# Patient Record
Sex: MALE | Race: WHITE | NOT HISPANIC OR LATINO
[De-identification: names, ages, dates, MRNs, and addresses within clinical notes are randomized per-mention and may not be internally consistent; named-entity substitution may affect disease eponyms.]

---

## 2021-09-02 ENCOUNTER — APPOINTMENT (OUTPATIENT)
Dept: UROLOGY | Facility: CLINIC | Age: 71
End: 2021-09-02
Payer: MEDICARE

## 2021-09-02 VITALS
WEIGHT: 168 LBS | DIASTOLIC BLOOD PRESSURE: 71 MMHG | SYSTOLIC BLOOD PRESSURE: 126 MMHG | BODY MASS INDEX: 24.05 KG/M2 | HEIGHT: 70 IN | HEART RATE: 51 BPM

## 2021-09-02 DIAGNOSIS — Z82.49 FAMILY HISTORY OF ISCHEMIC HEART DISEASE AND OTHER DISEASES OF THE CIRCULATORY SYSTEM: ICD-10-CM

## 2021-09-02 DIAGNOSIS — Z86.79 PERSONAL HISTORY OF OTHER DISEASES OF THE CIRCULATORY SYSTEM: ICD-10-CM

## 2021-09-02 DIAGNOSIS — Z78.9 OTHER SPECIFIED HEALTH STATUS: ICD-10-CM

## 2021-09-02 DIAGNOSIS — Z80.9 FAMILY HISTORY OF MALIGNANT NEOPLASM, UNSPECIFIED: ICD-10-CM

## 2021-09-02 PROBLEM — Z00.00 ENCOUNTER FOR PREVENTIVE HEALTH EXAMINATION: Status: ACTIVE | Noted: 2021-09-02

## 2021-09-02 PROCEDURE — 99204 OFFICE O/P NEW MOD 45 MIN: CPT

## 2021-09-02 RX ORDER — METOPROLOL TARTRATE 75 MG/1
TABLET, FILM COATED ORAL
Refills: 0 | Status: ACTIVE | COMMUNITY

## 2021-09-02 NOTE — PHYSICAL EXAM
[General Appearance - Well Developed] : well developed [General Appearance - Well Nourished] : well nourished [Normal Appearance] : normal appearance [Well Groomed] : well groomed [General Appearance - In No Acute Distress] : no acute distress [Heart Rate And Rhythm] : Heart rate and rhythm were normal [Respiration, Rhythm And Depth] : normal respiratory rhythm and effort [Exaggerated Use Of Accessory Muscles For Inspiration] : no accessory muscle use [Auscultation Breath Sounds / Voice Sounds] : lungs were clear to auscultation bilaterally [Abdomen Soft] : soft [Abdomen Tenderness] : non-tender [Abdomen Hernia] : no hernia was discovered [Costovertebral Angle Tenderness] : no ~M costovertebral angle tenderness [Penis Abnormality] : normal circumcised penis [Epididymis] : the epididymides were normal [Testes Tenderness] : no tenderness of the testes [Testes Mass (___cm)] : there were no testicular masses [Anus Abnormality] : the anus and perineum were normal [Rectal Exam - Rectum] : digital rectal exam was normal [Prostate Enlargement] : the prostate was not enlarged [Prostate Tenderness] : the prostate was not tender [No Prostate Nodules] : no prostate nodules [Prostate Size ___ gm] : prostate size [unfilled] gm [Normal Station and Gait] : the gait and station were normal for the patient's age [] : no rash [Oriented To Time, Place, And Person] : oriented to person, place, and time [Affect] : the affect was normal [Mood] : the mood was normal [Not Anxious] : not anxious [FreeTextEntry1] : DTR's & BC reflexes were intact

## 2021-09-02 NOTE — HISTORY OF PRESENT ILLNESS
[Urinary Urgency] : urinary urgency [Urinary Frequency] : urinary frequency [Nocturia] : nocturia [Straining] : straining [Weak Stream] : weak stream [Intermittency] : intermittency [Erectile Dysfunction] : Erectile Dysfunction [None] : None [FreeTextEntry1] : Bijan is a 71-year-old white male born March 18, 1950 who presents with voiding and erectile dysfunction.  He has had the erectile dysfunction for at least 3 years.  He has had the voiding dysfunction for 7 or 8 years tells me he has been tried on Flomax, finasteride as well as several other drugs which really did not help\par \par With respect to the erections he has been taking sildenafil at the 100 mg dose which no longer works as well as he and his wife would like.  In fact his wife feels that some of his erectile dysfunction may be secondary to some of the medications that he had taken in the past\par \par His AUA symptom score includes\par Incomplete emptying–4\par Frequency–5\par Intermittency–4\par Urgency–4\par Slow stream–4\par Straining–4\par He wakes up at least 3 times per night which is making him quite tired and he is quality of life is equal to 3.  This gives him an AUA symptom score of 25/3/3\par \par With respect to his erections 100 mg of sildenafil is not something that he can use but he has not had ejaculation in close to 3 years.  With manual stimulation he can be brought to ejaculation but it is quite weak.\par \par His international index of erectile function equals\par Confidence–2\par Rigidity for penetration–2\par Maintenance–2\par Completion–2\par Satisfaction–3 that gives him and I IEF of 11\par \par He has seen a urologist for these issues over the years and has not had treatment that he felt was sufficient.  He had blood tests drawn 6 or 7 weeks ago we do not yet have the reports \par \par For the last year or so he has been having cardiac issues and tells me his doctor said he has cardiomegaly [Urinary Retention] : no urinary retention [Dysuria] : no dysuria [Hematuria - Gross] : no gross hematuria [Hematuria - Microscopic] : no microscopic hematuria

## 2021-09-02 NOTE — ASSESSMENT
[FreeTextEntry1] : He has voiding dysfunction with a small prostate and tells me he has been tried on various medications to start to remember which ones and who gave it to him.\par \par I will ask him to keep a record of his intake and output we will send urine off for analysis we will get a renal bladder ultrasound come back in for review and possibly a flow study\par \par He also has erectile dysfunction which is no longer responsive as well as would like to sildenafil.  I will order a full hormone panel especially given his testicular atrophy but given the penile atrophy this may be vascular in origin.  Going to want [place] criteria classification especially given his history of cardiomegaly when he comes back and we will review the results and discuss options.  Please note if he does stay with sildenafil I would recommend to 100 mg tablet instead of 520s at this much

## 2021-09-02 NOTE — ADDENDUM
[FreeTextEntry1] : After we were done and I tried to explain to him the various steps that we need to be taken he told me that he really does not want anything done with his urination.  He has had this for years seen many doctors and does not think anyone will ever be able to help him he just want the erections taken care of.\par \par He then told me that he had gone to [facility] where they did some sort of exam gave him an injection which did not work and offered him a better treatment for $2000.  He wants to know if I do the injection and if he can get that the next time he comes.\par \par He also said he wants to make sure that the next time is not an hour and a half before he gets in the room and sees the doctor and that he is a very busy man.  I apologized to sure that I was not here just sitting and doing nothing I am going from patient to patient since early this morning and that when he gets here he still has to register and he has to fill out papers and then he has to get into the room.  We will also waiting for some blood test which never came and his answer was he does not care he has been here an hour and a half and he is a busy man.\par \par He also told me he does not want to come out to [place] but I was referred as one of the most experience was in the area with this.  I offer him to contact [facility] who has a specialist in erectile dysfunction.  Once he makes an appointment and send us a release had be happy to send our note with full the record so he would not have to go through this again and help expedite his care over there.  He declined saying that since I am recommended he wants to come here.\par \par He will decide what he wants to do he want me to write him a prescription for sildenafil which I refused without having [place] criteria classification reviewing the fact that he tells me he has a history of cardiomegaly it would not be safe.  He does not understand why of the urologist he seen willing to give him a prescription without it and then I will for him to go back to those urologist.\par \par The end result is he will do whatever tests he wants when he wants we offered him an appointment after the Jainism holidays and if he likes to come back I will be happy to treat him if he elects to come back but did not do the test related to the voiding dysfunction only the blood tests and the [place] criteria I will be more than happy to work on that aspect and if he chooses to follow-up with a doctor in [place] I will be happy to forward the records.

## 2021-09-02 NOTE — LETTER BODY
[Consult Letter:] : I had the pleasure of evaluating your patient, [unfilled]. [Please see my note below.] : Please see my note below. [Consult Closing:] : Thank you very much for allowing me to participate in the care of this patient.  If you have any questions, please do not hesitate to contact me. [Sincerely,] : Sincerely, [Dear  ___] : Dear  [unfilled], [FreeTextEntry2] : Brandon Kern MD\par [address]\par [place] [id number]\par

## 2021-09-02 NOTE — LETTER HEADER
[FreeTextEntry3] : Jensen Brandon M.D.\par Director of Urology\par [facility]\par [address] [place] [address], [address]\[place] [address]

## 2021-10-15 ENCOUNTER — APPOINTMENT (OUTPATIENT)
Dept: UROLOGY | Facility: CLINIC | Age: 71
End: 2021-10-15
Payer: MEDICARE

## 2021-10-15 VITALS
WEIGHT: 168 LBS | DIASTOLIC BLOOD PRESSURE: 80 MMHG | HEIGHT: 70 IN | HEART RATE: 58 BPM | SYSTOLIC BLOOD PRESSURE: 132 MMHG | BODY MASS INDEX: 24.05 KG/M2

## 2021-10-15 PROCEDURE — 99215 OFFICE O/P EST HI 40 MIN: CPT | Mod: 25

## 2021-10-15 PROCEDURE — 51798 US URINE CAPACITY MEASURE: CPT

## 2021-10-15 PROCEDURE — 51741 ELECTRO-UROFLOWMETRY FIRST: CPT

## 2021-10-15 NOTE — HISTORY OF PRESENT ILLNESS
[Urinary Urgency] : urinary urgency [Urinary Frequency] : urinary frequency [Nocturia] : nocturia [Straining] : straining [Weak Stream] : weak stream [Intermittency] : intermittency [Erectile Dysfunction] : Erectile Dysfunction [None] : None [FreeTextEntry1] : Bijan is a 71-year-old male born March 18, 1950 seen on September 2, 2021 ostensibly for voiding and erectile dysfunction.  Towards the end of the encounter he told me he really was not that interested in the voiding dysfunction as he did not think we could help, he wanted medication and treatment without [place] criteria classification or evaluation and I would not do that and he was given the option of getting me the information that I needed and coming back or going elsewhere.  He spoke to his PCP who recommended he come back he got [place] criteria classification and blood tests and is here to review.  He did not do the ultrasound has at this point he really is not strong on the voiding evaluation though after discussion he was willing to do the flow study because part of his problem is the ejaculate does not come out during relations but trickles out at a later time and that implies some element of possibly prostatic dysfunction which the flow study would help.  He did the flow study once with a very poor tracing who while we were talking he had a stronger urge to go and did it again which he said was a more accurate representation of his typical flow [Urinary Retention] : no urinary retention [Dysuria] : no dysuria [Hematuria - Gross] : no gross hematuria [Hematuria - Microscopic] : no microscopic hematuria

## 2021-10-15 NOTE — ASSESSMENT
[FreeTextEntry1] : We had a long discussion differentiating between prevalence in normal or I explained to him that I think the reference range that [employer] uses for normal in someone that is 1 day into their 70s here is actually prevalence rather than what is appropriate.  If we take the numbers that would have been normal if he was 1 day less than 70 his total and free are at the lower limit of normal and his bioavailable is below normal.  If he would like to explore treating that I would have him repeat the hormone levels at a different lab and if he is indeed low we would then discuss replacement options.  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  If he like to be able to void better I would want to do urodynamics to see if this is bladder, prostate or sphincteric.  One of the reasons to treat his prostate is if the decreased ejaculatory force is due to prostatic obstruction and most of the surgical treatments treatments in addition to not helping may make it worse as most if not all of these treatments affect the bladder neck and therefore would make the ejaculatory force even less.\par \par With respect to his erections he has a normal desire his testosterone level at worst is borderline low and if he feels he has a normal libido then I think we could wait and watch on that.  We discussed that the normal range when you were at the lower limit of normal means that 90% of men have higher testosterone levels than you did.  He has failed PDE 5 inhibitors though they might work better if his testosterone was at mid range level and if he like to pursue that option we will repeat the testosterone if it is indeed low start him on replacement and see if the PDE 5 inhibitors were better.  If he is not willing to go that route then we can teach him self injection something he had done at [facility] which I feel is cost prohibitive and usually which we can arrange for him to get at a much more reasonable price.\par \par He would like to explore the testosterone option but he has not been able to have good sex in a while and really does not want to wait and do these evaluations sequentially.  He would like to check his testosterone and consider replacement but he would like to start a soft injection program.  He understands that we do not supply or stop the dose he would have to get a test sample for him.

## 2021-10-15 NOTE — LETTER HEADER
[FreeTextEntry3] : Jensen Brandon M.D.\par Director of Urology\par [facility]\par [address] [place] [address], [address]\[place] [address]

## 2021-10-15 NOTE — LETTER BODY
[Dear  ___] : Dear  [unfilled], [Consult Letter:] : I had the pleasure of evaluating your patient, [unfilled]. [Please see my note below.] : Please see my note below. [Consult Closing:] : Thank you very much for allowing me to participate in the care of this patient.  If you have any questions, please do not hesitate to contact me. [Sincerely,] : Sincerely, [FreeTextEntry2] : Brandon Kern MD\par [address]\par [place] [id number]\par

## 2021-10-18 LAB
ESTRADIOL SERPL-MCNC: 28 PG/ML
TESTOST BND SERPL-MCNC: 6.4 PG/ML
TESTOSTERONE TOTAL S: 470 NG/DL

## 2021-10-19 LAB — SHBG SERPL-SCNC: 39.3 NMOL/L

## 2021-10-20 LAB
TESTOSTERONE FREE/WEAKLY BND: 46.3 NG/DL
TESTOSTERONE TOTAL S: 472 NG/DL
TESTOSTERONE, % FREE/WEAKLY BND: 9.8 %

## 2021-11-12 ENCOUNTER — NON-APPOINTMENT (OUTPATIENT)
Age: 71
End: 2021-11-12

## 2021-11-12 ENCOUNTER — APPOINTMENT (OUTPATIENT)
Dept: UROLOGY | Facility: CLINIC | Age: 71
End: 2021-11-12
Payer: MEDICARE

## 2021-11-12 VITALS
DIASTOLIC BLOOD PRESSURE: 78 MMHG | SYSTOLIC BLOOD PRESSURE: 131 MMHG | HEART RATE: 61 BPM | BODY MASS INDEX: 23.96 KG/M2 | WEIGHT: 167.4 LBS | HEIGHT: 70 IN

## 2021-11-12 PROCEDURE — 54235 NJX CORPORA CAVERNOSA RX AGT: CPT

## 2021-11-12 PROCEDURE — J3490T: CUSTOM | Mod: NC

## 2021-11-12 PROCEDURE — 99214 OFFICE O/P EST MOD 30 MIN: CPT | Mod: 25

## 2021-11-12 NOTE — PHYSICAL EXAM
[General Appearance - Well Developed] : well developed [General Appearance - Well Nourished] : well nourished [Normal Appearance] : normal appearance [Well Groomed] : well groomed [General Appearance - In No Acute Distress] : no acute distress [Abdomen Soft] : soft [Abdomen Tenderness] : non-tender [Costovertebral Angle Tenderness] : no ~M costovertebral angle tenderness [Urethral Meatus] : meatus normal [Penis Abnormality] : normal circumcised penis [Urinary Bladder Findings] : the bladder was normal on palpation [Scrotum] : the scrotum was normal [Testes Tenderness] : no tenderness of the testes [Testes Mass (___cm)] : there were no testicular masses [] : no rash [Oriented To Time, Place, And Person] : oriented to person, place, and time [Affect] : the affect was normal [Mood] : the mood was normal [Not Anxious] : not anxious [Normal Station and Gait] : the gait and station were normal for the patient's age [No Focal Deficits] : no focal deficits

## 2021-11-14 NOTE — LETTER HEADER
[FreeTextEntry3] : Jensen Brandon M.D.\par Director of Urology\par [facility]\par [address] [place] [address], [address]\[place] [address]

## 2021-11-14 NOTE — ASSESSMENT
[FreeTextEntry1] : His testosterone levels are within normal limits.  He has ED refractory to PDE 5 inhibitors.  He has tried some form of injection in the past with good results.  All options reviewed and he is electing to undergo trial of Trimix, please see separate procedure note for further details.\par \par Addendum:\par After the injection though he got an acceptable response for the volume of medication injected, 0.1 mL of formula number 530/1/10, he wishes to follow-up with his wife so she can learn to inject as well as he would like a second pair of eyes during the injection process.\par \par Follow-up in a few weeks for reeducation

## 2021-11-14 NOTE — LETTER BODY
[Dear  ___] : Dear  [unfilled], [Consult Letter:] : I had the pleasure of evaluating your patient, [unfilled]. [Please see my note below.] : Please see my note below. [Consult Closing:] : Thank you very much for allowing me to participate in the care of this patient.  If you have any questions, please do not hesitate to contact me. [Sincerely,] : Sincerely, [FreeTextEntry2] : Brandon Kern MD\par [address]\par [place] [address]\par

## 2021-12-02 ENCOUNTER — APPOINTMENT (OUTPATIENT)
Dept: UROLOGY | Facility: CLINIC | Age: 71
End: 2021-12-02
Payer: MEDICARE

## 2021-12-02 VITALS
WEIGHT: 168 LBS | SYSTOLIC BLOOD PRESSURE: 129 MMHG | HEART RATE: 60 BPM | DIASTOLIC BLOOD PRESSURE: 79 MMHG | HEIGHT: 70 IN | BODY MASS INDEX: 24.05 KG/M2

## 2021-12-02 PROCEDURE — 99214 OFFICE O/P EST MOD 30 MIN: CPT

## 2021-12-02 NOTE — ASSESSMENT
[FreeTextEntry1] : I showed his wife the anatomy both with drawings and then on him.  She did the injection but needed a lot of guidance as she kept rotating the penis when she pulled to the side and was going to inject into the spongiosum.  The end result was she did the injection she feels comfortable she can do it at home and though I would have preferred to go home and see how good of the result he got with 0.2 his wife is going to work.  We discussed frequency–no more than once per day up to 3 times per week, we discussed timing–it should be done essentially as part of foreplay they begin the foreplay when they are ready to approach penis vaginal contact they can stop and inject them.  She can draw the medication before so its minimal interruption and after the injection one-person asked to hold his penis either she does it and he has both hands.  He complied with her body wash he does not and she has both hands free.  They will have to work this out to how they like to have intimacy.\par \par I [facility] the medication from [facility] I offered to give him a written prescription they could find their own compounding pharmacy they prefer to go with [facility].  Have tried a couple of times and then come back for follow-up.\par \par They questioned how to do this on the Sabbath as when possible if they prefer to avoid making wounds which an injection is on the Sabbath.  He was told that really cannot be done as he cannot inject himself before keep an erection for 3 hours go to [employer] have the seventh meal and then go to bed as a 3-hour erection would be painful and dangerous.  He knows if at any point he gets an erection that lasts more than an hour and a half or if at any point it becomes painful he has to call me right away.

## 2021-12-02 NOTE — LETTER BODY
[Dear  ___] : Dear  [unfilled], [Courtesy Letter:] : I had the pleasure of seeing your patient, [unfilled], in my office today. [Please see my note below.] : Please see my note below. [Consult Closing:] : Thank you very much for allowing me to participate in the care of this patient.  If you have any questions, please do not hesitate to contact me. [Sincerely,] : Sincerely, [FreeTextEntry2] : Brandon Kern MD\par [address]\par [place] [address]\par

## 2021-12-02 NOTE — PHYSICAL EXAM
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

## 2021-12-02 NOTE — HISTORY OF PRESENT ILLNESS
[FreeTextEntry1] : Bijan is a 71-year-old male born March 18, 1950 last seen November 12, 2021 at which time he took himself injection.  He was uncomfortable with it requested to come in with his wife and they are here today for me to teach her the anatomy how to draw the medication and how to do the injection [Urinary Retention] : no urinary retention [Urinary Urgency] : urinary urgency [Urinary Frequency] : urinary frequency [Nocturia] : nocturia [Straining] : straining [Weak Stream] : weak stream [Intermittency] : intermittency [Erectile Dysfunction] : Erectile Dysfunction [Dysuria] : no dysuria [Hematuria - Gross] : no gross hematuria [Hematuria - Microscopic] : no microscopic hematuria [None] : None

## 2021-12-02 NOTE — LETTER HEADER
[FreeTextEntry3] : Jensen Brandon M.D.\par Director of Urology\par [facility]\par [address] [place] [address], [address]\[place] [address]

## 2022-01-14 ENCOUNTER — APPOINTMENT (OUTPATIENT)
Dept: UROLOGY | Facility: CLINIC | Age: 72
End: 2022-01-14
Payer: MEDICARE

## 2022-01-14 VITALS
DIASTOLIC BLOOD PRESSURE: 78 MMHG | SYSTOLIC BLOOD PRESSURE: 129 MMHG | WEIGHT: 164 LBS | HEART RATE: 55 BPM | HEIGHT: 70 IN | TEMPERATURE: 97.6 F | BODY MASS INDEX: 23.48 KG/M2

## 2022-01-14 PROCEDURE — 99214 OFFICE O/P EST MOD 30 MIN: CPT

## 2022-01-14 NOTE — ASSESSMENT
[FreeTextEntry1] : needs t go to a higher dose +/- place a ring \par link given so he can look at it on line\par \par He has not caused any damage to the penis and I have reassured him that if I told him he can go higher and faster than that is what I feel and I think he should do that.  We also discussed using a stay erect ring and we will have the staff get him the website that he can use to get the rings.\par \par His wife wants to know if he can use sildenafil with the shot concurrently and though that does not come in therapy due to the additive risk of priapism.  I think if he starts with low dose, he has the 20 mg tablets perhaps starting at 20 to 40 mg that should be acceptable.\par \par But we will leave it at is\par One–raise the dose by 0.2 aliquots until he reaches 1.0\par 2–try adding in sildenafil concurrently with the shot either now before he raises the dose or after he raises a dose but before he goes even higher\par 3–trying the stay erect ring\par Follow-up in 1 month\par They wanted more needles they will have to get them from the company especially as he wants a 31-gauge and I gave him some alcohol wipes

## 2022-01-14 NOTE — HISTORY OF PRESENT ILLNESS
[FreeTextEntry1] : Bijan is a patient we are seeing for erectile dysfunction.  He has other urologic issues that he has told us he is not interested in pursuing.  Last time he was here we taught his wife how to inject him he tells me that given that he knows he knows how to do it he is relaxed enough he is able to do it himself.  They have used it 4 times since December 2 starting at 0.2 and going up very slowly the last time going to 0.35 mL he is using formula number 530/1/10\par \par He tells me that its really not hard when his wife was contacted she says it is going up to about a 6 he is able to penetrate but he is not obtaining sufficient rigidity to do the thrusting motion.  After 30 seconds of images from lying around she just had them stop.  The question is what dose can go to, why is he going up so slowly but I told him he can go up faster and what other options we can use.  He has not had any pain there is no black and blue marks and he is getting rapidly frustrated though he knew from the beginning this is not like instant coffee [Erectile Dysfunction] : Erectile Dysfunction

## 2022-01-14 NOTE — LETTER BODY
[Dear  ___] : Dear  [unfilled], [Courtesy Letter:] : I had the pleasure of seeing your patient, [unfilled], in my office today. [Please see my note below.] : Please see my note below. [Consult Closing:] : Thank you very much for allowing me to participate in the care of this patient.  If you have any questions, please do not hesitate to contact me. [Sincerely,] : Sincerely, [FreeTextEntry2] : Brandon Kern MD\par [address]\par [place] [address]\par

## 2022-01-14 NOTE — ADDENDUM
[FreeTextEntry1] : he got his wife on the phone and said he was about a 6 \par he will also add in sildenafil 40 mg with the shot

## 2022-01-14 NOTE — PHYSICAL EXAM
[General Appearance - Well Developed] : well developed [General Appearance - Well Nourished] : well nourished [Normal Appearance] : normal appearance [Well Groomed] : well groomed [General Appearance - In No Acute Distress] : no acute distress [Abdomen Soft] : soft [Abdomen Tenderness] : non-tender [Costovertebral Angle Tenderness] : no ~M costovertebral angle tenderness [Oriented To Time, Place, And Person] : oriented to person, place, and time [Affect] : the affect was normal [Mood] : the mood was normal [Not Anxious] : not anxious [FreeTextEntry1] : Slightly confrontational, less so than before but still more than most [Normal Station and Gait] : the gait and station were normal for the patient's age [No Focal Deficits] : no focal deficits

## 2022-01-14 NOTE — LETTER HEADER
[FreeTextEntry3] : Jensen Brandon M.D.\par Director of Urology\par [facility]\par [address] [place] [address], [address] [address]

## 2022-04-25 ENCOUNTER — APPOINTMENT (OUTPATIENT)
Dept: UROLOGY | Facility: CLINIC | Age: 72
End: 2022-04-25
Payer: MEDICARE

## 2022-04-25 VITALS
BODY MASS INDEX: 24.34 KG/M2 | WEIGHT: 170 LBS | DIASTOLIC BLOOD PRESSURE: 74 MMHG | SYSTOLIC BLOOD PRESSURE: 122 MMHG | HEIGHT: 70 IN

## 2022-04-25 PROCEDURE — 99214 OFFICE O/P EST MOD 30 MIN: CPT

## 2022-04-25 NOTE — PHYSICAL EXAM
[General Appearance - Well Developed] : well developed [General Appearance - Well Nourished] : well nourished [Normal Appearance] : normal appearance [Well Groomed] : well groomed [General Appearance - In No Acute Distress] : no acute distress [Abdomen Soft] : soft [Abdomen Tenderness] : non-tender [Costovertebral Angle Tenderness] : no ~M costovertebral angle tenderness [Penis Abnormality] : normal circumcised penis [FreeTextEntry1] : no focal fibrosis [] : no respiratory distress [Respiration, Rhythm And Depth] : normal respiratory rhythm and effort [Exaggerated Use Of Accessory Muscles For Inspiration] : no accessory muscle use [Oriented To Time, Place, And Person] : oriented to person, place, and time [Affect] : the affect was normal [Mood] : the mood was normal [Not Anxious] : not anxious [Normal Station and Gait] : the gait and station were normal for the patient's age [No Focal Deficits] : no focal deficits

## 2022-04-25 NOTE — HISTORY OF PRESENT ILLNESS
[FreeTextEntry1] : Bijan is a 72-year-old male last seen January 14, 2022.  He had come in with his wife we made sure they knew how to do the injections, using formula number 5--30/1/10, and he was going to slowly raise the dose up to 1 mL if necessary doing combination therapy with sildenafil if necessary trying to arrange.  He tells me that\par 0.8 mL with 40 mg of sildenafil he got what he would not feel was sufficient but his wife said was hard enough and she was satisfied with the\par He went up to 0.9 mL and he says the rigidity was not any better and the skin along the shaft bubble.  It did not go away for 2 weeks and then the next time he tried 1 mL and now again the skin bubbled but it stayed that way for only 24 hours.  The rigidity was not that great, and he decided to put it on hold until he can get back in.\par \par He tells me he did not use the ring as his wife felt that it was unnatural and did not want it.  He is here to discuss his options\par \par As far as his voiding dysfunction that has not changed again is not interested in treatment for that because whenever he tried in the past did not work. [Erectile Dysfunction] : Erectile Dysfunction

## 2022-04-25 NOTE — LETTER HEADER
[FreeTextEntry3] : Jensen Brandon M.D.\par Director Emeritus of Urology\par [facility]\par [address] [place] [address], [address]\[place] [address]

## 2022-04-25 NOTE — ASSESSMENT
[FreeTextEntry1] : Presumably he has leak, the best treatment for leak would be ring or an implant.  We went over the implant again the mechanism by which had asked the fact that it is a mechanical device has a limited life expectancy and it is a foreign body so there is always a risk for infection.\par \par We also discussed changing the concentration of the medication and the Trimix so we can inject less and still possibly get the same or better effect and I feel he should use combination therapy with sildenafil and if he really wants optimal results he should use the ring.  If his wife does not want to use the ring has he she saying it is good enough for her that I am not sure why he is pushing the issue.  He is not reaching orgasm from penis vaginal contact he wants to be able to achieve penetration to give her satisfaction is she saying she is satisfied if her decision.\par \par He is now not sure if he is injecting it right again he brought his wife and because he was not comfortable and I thought the plan was that she would help in doing.  He is telling me that she is really not comfortable with that and I do not have a option for that.  Either he does the injection, she he does the injection or she guides him and does not directly pass the needle which may make her more comfortable.\par \par He has to make a decision\par Raise the dose\par His wife injects him so we know it is in the right spot\par Use combination therapy either 0.8 that sildenafil which was good enough for his wife will try all 3 which may be good enough for his wife and better for him\par Go to an implant he is aware that it is a mechanical device, there is no going back it displaces the tissue so if you not like the implant you can't go back to injections and there is always a risk of breakage and infection\par Stop everything and live without pain is vaginal contact/penetration

## 2022-05-12 ENCOUNTER — APPOINTMENT (OUTPATIENT)
Dept: UROLOGY | Facility: CLINIC | Age: 72
End: 2022-05-12
Payer: MEDICARE

## 2022-05-12 VITALS
WEIGHT: 178 LBS | HEIGHT: 70 IN | BODY MASS INDEX: 25.48 KG/M2 | HEART RATE: 52 BPM | SYSTOLIC BLOOD PRESSURE: 127 MMHG | DIASTOLIC BLOOD PRESSURE: 75 MMHG

## 2022-05-12 PROCEDURE — 99215 OFFICE O/P EST HI 40 MIN: CPT

## 2022-05-12 NOTE — HISTORY OF PRESENT ILLNESS
[FreeTextEntry1] : Bijan is a 72-year-old male born March 18 1950 last seen on April 25, 2022.  We have him on Trimix his wife had come in with him in the past reportedly she was injecting it properly has not been working the question is, is this endorgan failure, is this technique, does he need a higher dose.  A higher dose was ordered unfortunately there were communication issues and it has not yet come in they are here anyway they want to make sure she is injecting it properly. [Erectile Dysfunction] : Erectile Dysfunction

## 2022-05-12 NOTE — PHYSICAL EXAM
[General Appearance - Well Developed] : well developed [General Appearance - Well Nourished] : well nourished [Normal Appearance] : normal appearance [Well Groomed] : well groomed [General Appearance - In No Acute Distress] : no acute distress [Abdomen Soft] : soft [Abdomen Tenderness] : non-tender [Costovertebral Angle Tenderness] : no ~M costovertebral angle tenderness [Penis Abnormality] : normal circumcised penis [Heart Rate And Rhythm] : Heart rate and rhythm were normal [] : no respiratory distress [Respiration, Rhythm And Depth] : normal respiratory rhythm and effort [Exaggerated Use Of Accessory Muscles For Inspiration] : no accessory muscle use [Oriented To Time, Place, And Person] : oriented to person, place, and time [Affect] : the affect was normal [Mood] : the mood was normal [Not Anxious] : not anxious [FreeTextEntry1] : He was quite irate about the lack of him obtaining the stronger dose [Normal Station and Gait] : the gait and station were normal for the patient's age [No Focal Deficits] : no focal deficits

## 2022-05-12 NOTE — ASSESSMENT
[FreeTextEntry1] : There were several issues here. \par  #1 there technique was poor.  They were not sure if he was going to inject, if he was going to inject, she was drawing up the medication without prepping the bottle in fact we had a new bottle of sample she tried to put the\par Without taking off the middle top.  She was then pointing out to him to inject into the spongiosum and then when he went to inject he was pushing on the plunger while putting the needle in.  Then when pushing down on the plunger once the needle was in there were pulling back on the bowel wall pushing down on the plunger pulling the needle out of the corpus.\par \par All of these techniques were reviewed, proper technique to draw up the medication, have to get rid of the air bubbles, had to identify where to inject by identifying the neurovascular bundle in the groove between the spongiosum and cavernosum, injecting perpendicular to the plane of the penis 45 degrees of the vertical, holding the barrel steady while pushing the plunger down, and then holding the penis for 5 minutes not 3 minutes not 2 minutes not thinking about it for 5 minutes blood pressure for 5 minutes to decrease the chance of intracavernosal fibrosis and subcutaneous bleeding.\par \par 2.,  How they were looking at this.  His wife was saying he should inject in the bathroom and then come in and I told him if you do that it does not work this has to be part of sexual play.  There are times she needs K-Y jelly they would have foreplay and then when he is getting ready they would apply the jelly.  The same way here they should prepare the syringe beforehand leave the rest of the medicine in the fridge, begin foreplay and with the getting close to wanting penis vaginal contact did not do the injection.  If he cannot do it and she now knows how she can do it.  Once the needle was withdrawn the penis has to be held for 5 minutes.  If he holds it she has both hands free, if she halted he has both hands free and they should continue the sexual play.  I told him at couples as a necessary he will to be done separately from the sexual play they end up not doing it long-term.\par \par 3.,  He will be getting the stronger dose formula #830/2/20.  He will start with 0.5 mL, not half a syringe as the syringes we use here are half cc syringes he will be getting is 1 cc, but 0.5 mL working up as needed and tolerated to 1.0 mL.\par \par 4, they then asked can they use sildenafil with this and the answer is its really not the recommended way but when all else fails and it the only other option is an implant that I see no reason why not.  They will start with 20 mg work the way up as needed and tolerated to the 100 mg dose to be taken 30 to 60 minutes before sexual activity preferably on an empty stomach.\par \par 5.,  If the higher dose and there is still a more concentrated dose we can go up to 30/6/100, does not work without or with PDE 5 concurrent inhibition and or with a stay erect rings something he still part is refusing, they will either have to consider an implant or sex without penis in the vagina.  They we will cross that bridge when they come to it

## 2022-05-12 NOTE — LETTER HEADER
[FreeTextEntry3] : Jensen Brandon M.D.\par Director Emeritus of Urology\par [facility]\par [address] [place] [address], [address]\[place] [address]

## 2022-05-12 NOTE — LETTER BODY
[Dear  ___] : Dear  [unfilled], [Courtesy Letter:] : I had the pleasure of seeing your patient, [unfilled], in my office today. [Please see my note below.] : Please see my note below. [Sincerely,] : Sincerely, [FreeTextEntry2] : Brandon Kern MD\par [address]\par [place] [address]\par

## 2022-08-01 ENCOUNTER — APPOINTMENT (OUTPATIENT)
Dept: UROLOGY | Facility: CLINIC | Age: 72
End: 2022-08-01

## 2022-08-01 VITALS
WEIGHT: 172 LBS | BODY MASS INDEX: 24.62 KG/M2 | HEART RATE: 50 BPM | SYSTOLIC BLOOD PRESSURE: 118 MMHG | HEIGHT: 70 IN | DIASTOLIC BLOOD PRESSURE: 65 MMHG

## 2022-08-01 PROCEDURE — 99214 OFFICE O/P EST MOD 30 MIN: CPT

## 2022-08-01 NOTE — LETTER HEADER
[FreeTextEntry3] : Jensen Brandon M.D.\par Director Emeritus of Urology\par [facility]\par [address] [place] [address], [address]\[place] [address]

## 2022-08-01 NOTE — HISTORY OF PRESENT ILLNESS
[Erectile Dysfunction] : Erectile Dysfunction [FreeTextEntry1] : Bijan is a 72-year-old male, born March 18, 1950 last seen May 12, 2022 with ED, managed on maximum dose of Trimix with 10 units per injection, and adding sildenafil.\par \par He is still experiencing poor quality erections, about 5/10, rigidity, and after each of the last 2 shots he has had prolonged discomfort post injection.\par \par Please note he has not been doing telling me that his wife not interested in that.  His wife was in the room and says she has no idea what he is talking about.  In addition she says told directions apartment to use to me she feels they are good enough\par \par

## 2022-08-01 NOTE — PHYSICAL EXAM
[General Appearance - Well Developed] : well developed [General Appearance - Well Nourished] : well nourished [Normal Appearance] : normal appearance [Well Groomed] : well groomed [General Appearance - In No Acute Distress] : no acute distress [Abdomen Soft] : soft [Abdomen Tenderness] : non-tender [Costovertebral Angle Tenderness] : no ~M costovertebral angle tenderness [Urethral Meatus] : meatus normal [Urinary Bladder Findings] : the bladder was normal on palpation [Scrotum] : the scrotum was normal [Testes Tenderness] : no tenderness of the testes [Testes Mass (___cm)] : there were no testicular masses [Edema] : no peripheral edema [] : no respiratory distress [Respiration, Rhythm And Depth] : normal respiratory rhythm and effort [Exaggerated Use Of Accessory Muscles For Inspiration] : no accessory muscle use [Oriented To Time, Place, And Person] : oriented to person, place, and time [Affect] : the affect was normal [Mood] : the mood was normal [Not Anxious] : not anxious [Normal Station and Gait] : the gait and station were normal for the patient's age [No Focal Deficits] : no focal deficits [FreeTextEntry1] : 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  The fibrotic area was about a half a centimeter in length he is slightly tender.  This is where he is feeling the pain and this is where he injected himself

## 2022-08-01 NOTE — ASSESSMENT
[FreeTextEntry1] : He has a small area fibrosis of the penis though he tells me he needs to self compress for 5 minutes 5:00 after each and every infection.  This is the area where he experienced discomfort after he took the last 2 injections.  \par \par We dissused the use of 81 mg of ASA about an hour prior to injection to hep with discomfort as that helps since he.  We also discussed the use of the penile constrictive device to help track the blood within explaining the physiology behind venoocclusive dysfunction.  \par \par After discussion he is electing to use penile constriction device and continuing to inject using aspirin to help minimize the pain and see if their is improvement. \par \par He will continue at same dose and add penile constrictive device.  Follow-up in 6 months for symptom index and to discuss penile implant.  Please note he tells me the pharmacy will not fill for more than 10 needles at a time something I have never had with anyone else if it persisted bigger problem he has been asked to have the pharmacist call us

## 2022-08-01 NOTE — LETTER BODY
[Dear  ___] : Dear  [unfilled], [Courtesy Letter:] : I had the pleasure of seeing your patient, [unfilled], in my office today. [Please see my note below.] : Please see my note below. [Sincerely,] : Sincerely, [FreeTextEntry2] : Brandon Kern MD\par [address]\par [place] [address]\par

## 2023-02-09 ENCOUNTER — APPOINTMENT (OUTPATIENT)
Dept: UROLOGY | Facility: CLINIC | Age: 73
End: 2023-02-09
Payer: MEDICARE

## 2023-02-09 VITALS
DIASTOLIC BLOOD PRESSURE: 75 MMHG | HEIGHT: 70 IN | WEIGHT: 188 LBS | HEART RATE: 52 BPM | BODY MASS INDEX: 26.92 KG/M2 | SYSTOLIC BLOOD PRESSURE: 131 MMHG

## 2023-02-09 PROCEDURE — 99213 OFFICE O/P EST LOW 20 MIN: CPT

## 2023-02-09 RX ORDER — LIDOCAINE AND PRILOCAINE 25; 25 MG/G; MG/G
2.5-2.5 CREAM TOPICAL
Qty: 1 | Refills: 3 | Status: ACTIVE | COMMUNITY
Start: 2023-02-09 | End: 1900-01-01

## 2023-02-09 RX ORDER — CALCIUM CARB/VITAMIN D3/VIT K1 500-100-40
31G X 5/16" TABLET,CHEWABLE ORAL
Qty: 30 | Refills: 3 | Status: ACTIVE | COMMUNITY
Start: 2023-02-09 | End: 1900-01-01

## 2023-02-09 NOTE — HISTORY OF PRESENT ILLNESS
[Erectile Dysfunction] : Erectile Dysfunction [FreeTextEntry1] : Bijan is a 72-year-old male born March born March 18, 2018 for managed on Trimix#13 with 10 units per injection, and adding sildenafil at the 100 mg dose .\par \par With this protocol he is just about getting a decent enough erection however, he wishes to go up to form a #16 as it is really not great.  He is still experiencing pain with injections.  He says it is more at the time of needle entry its not the inside of the penis that in some people hurts when they get the erection\par \par He has some minimally bothersome lower urinary tract symptoms which he still, for does not wish for intervention at this time.\par \par \par

## 2023-02-09 NOTE — PHYSICAL EXAM
[General Appearance - Well Developed] : well developed [General Appearance - Well Nourished] : well nourished [Normal Appearance] : normal appearance [Well Groomed] : well groomed [General Appearance - In No Acute Distress] : no acute distress [Urethral Meatus] : meatus normal [Penis Abnormality] : normal circumcised penis [Urinary Bladder Findings] : the bladder was normal on palpation [Scrotum] : the scrotum was normal [Testes Tenderness] : no tenderness of the testes [Testes Mass (___cm)] : there were no testicular masses [] : no respiratory distress [Respiration, Rhythm And Depth] : normal respiratory rhythm and effort [Exaggerated Use Of Accessory Muscles For Inspiration] : no accessory muscle use [Oriented To Time, Place, And Person] : oriented to person, place, and time [Affect] : the affect was normal [Mood] : the mood was normal [Not Anxious] : not anxious [Normal Station and Gait] : the gait and station were normal for the patient's age [No Focal Deficits] : no focal deficits [FreeTextEntry1] : There is no tenderness at the injection site I do not feel any change in the degree of cortical thickening and there is no ecchymosis

## 2023-02-09 NOTE — LETTER HEADER
[FreeTextEntry3] : Jensen Brandon M.D.\par Director Emeritus of Urology\par [facility]\par [address] [place] [address], [address]\[place] [address]

## 2023-02-09 NOTE — ASSESSMENT
[FreeTextEntry1] : We will increase the concentration to formula #16 and he will use Emla cream performed his wife injects him as he is having pain with injection.  Because of his pattern of relations Friday evening and the [employer] law applications of ointments or creams preferring no on the Sabbath he will put it on just before the Sabbath realizing that it may wear off by the time to go to bed but hopefully there will be some residual activity.\par \par physical examination demonstrates no evidence of further fibrosis or scarring.  \par \par He does not wish for intervention regarding his urinary symptoms.\par \par Follow-up 6 months symptom index in the interim we renew the medication

## 2023-02-09 NOTE — LETTER BODY
[Dear  ___] : Dear  [unfilled], [Courtesy Letter:] : I had the pleasure of seeing your patient, [unfilled], in my office today. [Please see my note below.] : Please see my note below. [Sincerely,] : Sincerely, [FreeTextEntry2] : Brandon Kern MD\par [address]\par [place] [address]\par

## 2023-02-09 NOTE — END OF VISIT
[FreeTextEntry3] : I, Dr. Brandon, personally performed the evaluation and management (E/M) services for this established patient who presents today with (a) new problem(s)/exacerbation of (an) existing condition(s).  That E/M includes conducting the examination, assessing all new/exacerbated conditions, and establishing a new plan of care.  Today, my ACP, Jm Zavala was here to observe my evaluation and management services for this new problem/exacerbated condition to be followed going forward.

## 2023-08-17 ENCOUNTER — APPOINTMENT (OUTPATIENT)
Dept: UROLOGY | Facility: CLINIC | Age: 73
End: 2023-08-17
Payer: MEDICARE

## 2023-08-17 VITALS
DIASTOLIC BLOOD PRESSURE: 63 MMHG | HEIGHT: 70 IN | BODY MASS INDEX: 25.77 KG/M2 | WEIGHT: 180 LBS | HEART RATE: 56 BPM | SYSTOLIC BLOOD PRESSURE: 110 MMHG

## 2023-08-17 DIAGNOSIS — R79.89 OTHER SPECIFIED ABNORMAL FINDINGS OF BLOOD CHEMISTRY: ICD-10-CM

## 2023-08-17 DIAGNOSIS — R39.198 OTHER DIFFICULTIES WITH MICTURITION: ICD-10-CM

## 2023-08-17 DIAGNOSIS — R30.0 DYSURIA: ICD-10-CM

## 2023-08-17 DIAGNOSIS — N52.9 MALE ERECTILE DYSFUNCTION, UNSPECIFIED: ICD-10-CM

## 2023-08-17 DIAGNOSIS — R33.9 RETENTION OF URINE, UNSPECIFIED: ICD-10-CM

## 2023-08-17 DIAGNOSIS — R35.1 NOCTURIA: ICD-10-CM

## 2023-08-17 DIAGNOSIS — R39.15 URGENCY OF URINATION: ICD-10-CM

## 2023-08-17 DIAGNOSIS — R39.13 SPLITTING OF URINARY STREAM: ICD-10-CM

## 2023-08-17 DIAGNOSIS — R35.0 FREQUENCY OF MICTURITION: ICD-10-CM

## 2023-08-17 PROCEDURE — 99213 OFFICE O/P EST LOW 20 MIN: CPT

## 2023-08-17 NOTE — PHYSICAL EXAM
[General Appearance - Well Developed] : well developed [General Appearance - Well Nourished] : well nourished [Normal Appearance] : normal appearance [Well Groomed] : well groomed [General Appearance - In No Acute Distress] : no acute distress [Abdomen Soft] : soft [Abdomen Tenderness] : non-tender [Abdomen Hernia] : no hernia was discovered [Costovertebral Angle Tenderness] : no ~M costovertebral angle tenderness [Penis Abnormality] : normal circumcised penis [FreeTextEntry1] : Penile atrophy, midline dorsal Peyronie's, bilateral small hydroceles, prostate feels about 30 g wide and short with normal texture, BC reflex was intact [Edema] : no peripheral edema [] : no respiratory distress [Respiration, Rhythm And Depth] : normal respiratory rhythm and effort [Exaggerated Use Of Accessory Muscles For Inspiration] : no accessory muscle use [Oriented To Time, Place, And Person] : oriented to person, place, and time [Affect] : the affect was normal [Mood] : the mood was normal [Not Anxious] : not anxious [Normal Station and Gait] : the gait and station were normal for the patient's age [No Focal Deficits] : no focal deficits

## 2023-08-17 NOTE — LETTER HEADER
[FreeTextEntry3] : Jensen Brandon M.D. [address] [place] [address], [address] [place] [address]

## 2023-08-17 NOTE — LETTER BODY
[Dear  ___] : Dear  [unfilled], [Courtesy Letter:] : I had the pleasure of seeing your patient, [unfilled], in my office today. [Please see my note below.] : Please see my note below. [Sincerely,] : Sincerely, [FreeTextEntry2] : Brandon Kern MD\par  [address]\par  [place] [address]\par

## 2023-08-17 NOTE — ASSESSMENT
[FreeTextEntry1] : He is going to stay with sildenafil he understands he can get refills from his primary care doctor As far as his voiding it has not been good for a while he is living with that As far as his PSA it is stable and at his age he probably should not get it checked again is at this stage the cure is usually worse in the disease His penis shows the atrophy and Peyronie's that we have known about in the past I do not see any signs of fibrosis but given that he is stopping the injections its not an issue.  If at any point he changes his mind he will come back then and we will deal with it  To summarize he will stay with the sildenafil Follow-up here as needed

## 2023-08-17 NOTE — HISTORY OF PRESENT ILLNESS
[FreeTextEntry1] : Bijan is a 73-year-old male born March 18, 1950 last seen February 9, 2023.  He has voiding dysfunction with 2001 treated erectile dysfunction which we are having trouble treating has the options he is willing to use are not really that rate.  Currently he uses sildenafil at the 100 mg dose once a week usually Friday nights and is not great but its essentially what he is surrounding for.  He was on self injection he went up to formula #16 because of pain with injection we had him use of Emla cream and the added rigidity and duration that he gets from the injection over the sildenafil is not worth the discomfort hassle and cost.  He is here now not so much for his ED but he feels that periodically he should be checked for general urologic health. His last PSA was September 2021 when it was 2 he says he had it done on July 18, 2023 and the PSA was 2.28.  Comprehensive medical profile other than a glucose of 58 was normal Hemoglobin was 16.1 and a platelet count of 220,000 Lipid panel was good as were thyroid function tests [Erectile Dysfunction] : Erectile Dysfunction